# Patient Record
Sex: MALE | Race: OTHER | NOT HISPANIC OR LATINO
[De-identification: names, ages, dates, MRNs, and addresses within clinical notes are randomized per-mention and may not be internally consistent; named-entity substitution may affect disease eponyms.]

---

## 2020-08-07 ENCOUNTER — APPOINTMENT (OUTPATIENT)
Dept: DISASTER EMERGENCY | Facility: CLINIC | Age: 75
End: 2020-08-07

## 2020-08-07 VITALS
OXYGEN SATURATION: 97 % | TEMPERATURE: 98 F | HEART RATE: 64 BPM | WEIGHT: 244.93 LBS | DIASTOLIC BLOOD PRESSURE: 74 MMHG | SYSTOLIC BLOOD PRESSURE: 173 MMHG | HEIGHT: 68 IN | RESPIRATION RATE: 18 BRPM

## 2020-08-07 DIAGNOSIS — Z01.818 ENCOUNTER FOR OTHER PREPROCEDURAL EXAMINATION: ICD-10-CM

## 2020-08-07 RX ORDER — ENALAPRIL MALEATE AND HYDROCHLOROTHIAZIDE 10; 25 MG/1; MG/1
1 TABLET ORAL
Qty: 0 | Refills: 0 | DISCHARGE

## 2020-08-07 RX ORDER — ISOSORBIDE MONONITRATE 60 MG/1
1 TABLET, EXTENDED RELEASE ORAL
Qty: 0 | Refills: 0 | DISCHARGE

## 2020-08-07 RX ORDER — METOPROLOL TARTRATE 50 MG
1 TABLET ORAL
Qty: 0 | Refills: 0 | DISCHARGE

## 2020-08-07 RX ORDER — SIMVASTATIN 20 MG/1
1 TABLET, FILM COATED ORAL
Qty: 0 | Refills: 0 | DISCHARGE

## 2020-08-07 RX ORDER — TAMSULOSIN HYDROCHLORIDE 0.4 MG/1
1 CAPSULE ORAL
Qty: 0 | Refills: 0 | DISCHARGE

## 2020-08-07 NOTE — H&P ADULT - HISTORY OF PRESENT ILLNESS
Skeleton  Verify meds    Cardiologist: Dr. Shaik Castro  Pharmacy:  Covid:  Escort:    75 yo M with PMHx of HTN, HLD, heart murmur, CAD s/p ?PCI (where, when?), PVD, ESTEPHANIE (on CPAP)    NST per MD note: anteroapical and inferior moderate to severe ischemia.     In light of pt's risk factors, CCS Anginal Class ___ Sx, abnormal NST per MD note, pt referred for cardiac cath with possible intervention to r/o progressive CAD. Verify meds    Cardiologist: Dr. Shaik Castro  Pharmacy: [facility] pharmacy [phone] [address]  Covid: Covid test completed 8/7 in [facility] ([facility]  Escort: Angel Stanford    75 yo M with PMHx of HTN, HLD, CAD s/p PCI x1 ([facility], 9-10 years ago), PVD s/p B/L ablation, ESTEPHANIE (on CPAP), BPH s/p TURP in 2012, who presented to Cardiologist Dr. Shaik Castro with c/o non-radiating SSCP described as tightness of mild intensity with associated fatigue upon ambulation of 2-3 city blocks over past 2-3 months. Denies SOB, dizziness, diaphoresis, palpitations, LE edema, orthopnea, PND, syncope, N/V, abdominal pain. NST per MD note: anteroapical and inferior moderate to severe ischemia. In light of pt's risk factors, CCS Anginal Class III Sx, abnormal NST per MD note, pt referred for cardiac cath with possible intervention to r/o progressive CAD. Verify meds    Cardiologist: Dr. Shaik Castro  Pharmacy: [facility] pharmacy [phone] [address]  Covid: Covid test completed 8/7 in [facility])  Escort: Angel Stanford    73 yo M with PMHx of HTN, HLD, CAD s/p PCI x1 ([facility], 9-10 years ago), PVD s/p B/L venous ablation, ESTEPHANIE (on CPAP), BPH s/p TURP in 2012, who presented to Cardiologist Dr. Shaik Castro with c/o non-radiating SSCP described as tightness of mild intensity with associated fatigue upon ambulation of 2-3 city blocks over past 2-3 months. Denies SOB, dizziness, diaphoresis, palpitations, LE edema, orthopnea, PND, syncope, N/V, abdominal pain. NST per MD note: anteroapical and inferior moderate to severe ischemia. In light of pt's risk factors, CCS Anginal Class III Sx, abnormal NST per MD note, pt referred for cardiac cath with possible intervention to r/o progressive CAD. Verify meds    Cardiologist: Dr. Shaik Castro  Pharmacy: [facility] pharmacy [phone] [address]  Covid: Covid test completed 8/7 in [facility]  Escort: Angel Stanford    75 yo M with PMHx of HTN, HLD, CAD s/p PCI x1 ([facility] on 7/29/09 revealing mRCA 60-70% CSA 2.9 mm2 s/p PABLO), PVD s/p B/L venous ablation, ESTEPHANIE (on CPAP), BPH s/p TURP in 2012, who presented to Cardiologist Dr. Shaik Castro with c/o non-radiating SSCP described as tightness of mild intensity with associated fatigue upon ambulation of 2-3 city blocks over past 2-3 months. Denies SOB, dizziness, diaphoresis, palpitations, LE edema, orthopnea, PND, syncope, N/V, abdominal pain. NST per MD note: anteroapical and inferior moderate to severe ischemia. In light of pt's risk factors, CCS Anginal Class III Sx, abnormal NST per MD note, pt referred for cardiac cath with possible intervention to r/o progressive CAD.     Cath hx:  Cardiac cath at [facility] 7/29/09: mRCA 60-70% CSA 2.9 mm2 s/p PABLO Cardiologist: Dr. Shaik Castro  Pharmacy: [facility] pharmacy [phone] [phone]  Covid: Covid negative 08/07 in [facility]  Escort: Son Hien    75 yo M with PMHx of HTN, HLD, CAD s/p PCI x1 ([facility] on 7/29/09 revealing mRCA 60-70% CSA 2.9 mm2 s/p PABLO), PVD s/p B/L venous ablation, ESTEPHANIE (on CPAP), BPH s/p TURP in 2012, who presented to Cardiologist Dr. Shaik Castro with c/o non-radiating SSCP described as tightness of mild intensity with associated fatigue upon ambulation of 2-3 city blocks over past 2-3 months. Denies SOB, dizziness, diaphoresis, palpitations, LE edema, orthopnea, PND, syncope, N/V, abdominal pain. NST per MD note: anteroapical and inferior moderate to severe ischemia. In light of pt's risk factors, CCS Anginal Class III Sx, abnormal NST per MD note, pt referred for cardiac cath with possible intervention to r/o progressive CAD.     Cath hx:  Cardiac cath at [facility] 7/29/09: mRCA 60-70% CSA 2.9 mm2 s/p PABLO

## 2020-08-07 NOTE — H&P ADULT - GASTROINTESTINAL DETAILS
soft/nontender/no distention/normal soft/no rebound tenderness/nontender/bowel sounds normal/no distention/normal

## 2020-08-07 NOTE — H&P ADULT - ASSESSMENT
75 yo M with PMHx of HTN, HLD, CAD s/p PCI x1 ([facility] on 7/29/09 revealing mRCA 60-70% CSA 2.9 mm2 s/p PABLO), PVD s/p B/L venous ablation, ESTEPHANIE (on CPAP), BPH s/p TURP in 2012, who presents for cardiac catheterization secondary to CCS III anginal symptoms in the setting of an abnormal stress test    ASA III Mallampati III  Precath consented   Started IVF NS @ 75cc/h  Loaded with ???    Risks & benefits of procedure and alternative therapy have been explained to the patient including but not limited to: allergic reaction, bleeding w/possible need for blood transfusion, infection, renal and vascular compromise, limb damage, arrhythmia, stroke, vessel dissection/perforation, Myocardial infarction, emergent CABG. Informed consent obtained and in chart. 73 yo M with PMHx of HTN, HLD, CAD s/p PCI x1 ([facility] on 7/29/09 revealing mRCA 60-70% CSA 2.9 mm2 s/p PABLO), PVD s/p B/L venous ablation, ESTEPHANIE (on CPAP), BPH s/p TURP in 2012, who presents for cardiac catheterization secondary to CCS III anginal symptoms in the setting of an abnormal stress test.     ASA III Mallampati III  Precath consented   Started IVF NS @ 75cc/h x 4 hours.    Loaded with ASA 325mg and plavix 600mg.     Risks & benefits of procedure and alternative therapy have been explained to the patient including but not limited to: allergic reaction, bleeding w/possible need for blood transfusion, infection, renal and vascular compromise, limb damage, arrhythmia, stroke, vessel dissection/perforation, Myocardial infarction, emergent CABG. Informed consent obtained and in chart.

## 2020-08-07 NOTE — H&P ADULT - RS GEN PE MLT RESP DETAILS PC
normal/clear to auscultation bilaterally normal/no rales/clear to auscultation bilaterally/no rhonchi/no wheezes

## 2020-08-07 NOTE — H&P ADULT - NSICDXPASTMEDICALHX_GEN_ALL_CORE_FT
PAST MEDICAL HISTORY:  Coronary artery disease     Hyperlipidemia     Hypertension     ESTEPHANIE on CPAP     PVD (peripheral vascular disease)

## 2020-08-07 NOTE — H&P ADULT - NSICDXPASTSURGICALHX_GEN_ALL_CORE_FT
PAST SURGICAL HISTORY:  H/O prior ablation treatment h/o B/L venous ablation    Stented coronary artery

## 2020-08-07 NOTE — H&P ADULT - NSHPLABSRESULTS_GEN_ALL_CORE
EKG: 13.0   7.94  )-----------( 169      ( 10 Aug 2020 09:57 )             42.0       08-10    141  |  103  |  19  ----------------------------<  147<H>  3.9   |  25  |  0.83    Ca    9.3      10 Aug 2020 09:57    TPro  7.3  /  Alb  4.4  /  TBili  0.3  /  DBili  x   /  AST  18  /  ALT  10  /  AlkPhos  70  08-10      PT/INR - ( 10 Aug 2020 09:57 )   PT: 12.7 sec;   INR: 1.06          PTT - ( 10 Aug 2020 09:57 )  PTT:29.4 sec    CARDIAC MARKERS ( 10 Aug 2020 09:57 )  x     / x     / 140 U/L / x     / 1.5 ng/mL      EKG: NSR @ 63 bpm, q waves in inferior leads, no acute ischemic changes

## 2020-08-08 LAB — SARS-COV-2 N GENE NPH QL NAA+PROBE: NOT DETECTED

## 2020-08-10 ENCOUNTER — TRANSCRIPTION ENCOUNTER (OUTPATIENT)
Age: 75
End: 2020-08-10

## 2020-08-10 ENCOUNTER — INPATIENT (INPATIENT)
Facility: HOSPITAL | Age: 75
LOS: 0 days | Discharge: HOME | DRG: 247 | End: 2020-08-11
Attending: INTERNAL MEDICINE | Admitting: INTERNAL MEDICINE
Payer: COMMERCIAL
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
## 2020-08-10 DIAGNOSIS — Z98.890 OTHER SPECIFIED POSTPROCEDURAL STATES: Chronic | ICD-10-CM

## 2020-08-10 DIAGNOSIS — T82.855A STENOSIS OF CORONARY ARTERY STENT, INITIAL ENCOUNTER: ICD-10-CM

## 2020-08-10 DIAGNOSIS — Z95.5 PRESENCE OF CORONARY ANGIOPLASTY IMPLANT AND GRAFT: Chronic | ICD-10-CM

## 2020-08-10 LAB
A1C WITH ESTIMATED AVERAGE GLUCOSE RESULT: 5.9 % — HIGH (ref 4–5.6)
ALBUMIN SERPL ELPH-MCNC: 4.4 G/DL — SIGNIFICANT CHANGE UP (ref 3.3–5)
ALP SERPL-CCNC: 70 U/L — SIGNIFICANT CHANGE UP (ref 40–120)
ALT FLD-CCNC: 10 U/L — SIGNIFICANT CHANGE UP (ref 10–45)
ANION GAP SERPL CALC-SCNC: 13 MMOL/L — SIGNIFICANT CHANGE UP (ref 5–17)
APTT BLD: 29.4 SEC — SIGNIFICANT CHANGE UP (ref 27.5–35.5)
AST SERPL-CCNC: 18 U/L — SIGNIFICANT CHANGE UP (ref 10–40)
BASOPHILS # BLD AUTO: 0.02 K/UL — SIGNIFICANT CHANGE UP (ref 0–0.2)
BASOPHILS NFR BLD AUTO: 0.3 % — SIGNIFICANT CHANGE UP (ref 0–2)
BILIRUB SERPL-MCNC: 0.3 MG/DL — SIGNIFICANT CHANGE UP (ref 0.2–1.2)
BUN SERPL-MCNC: 19 MG/DL — SIGNIFICANT CHANGE UP (ref 7–23)
CALCIUM SERPL-MCNC: 9.3 MG/DL — SIGNIFICANT CHANGE UP (ref 8.4–10.5)
CHLORIDE SERPL-SCNC: 103 MMOL/L — SIGNIFICANT CHANGE UP (ref 96–108)
CHOLEST SERPL-MCNC: 106 MG/DL — SIGNIFICANT CHANGE UP (ref 10–199)
CK MB CFR SERPL CALC: 1.5 NG/ML — SIGNIFICANT CHANGE UP (ref 0–6.7)
CK SERPL-CCNC: 140 U/L — SIGNIFICANT CHANGE UP (ref 30–200)
CO2 SERPL-SCNC: 25 MMOL/L — SIGNIFICANT CHANGE UP (ref 22–31)
CREAT SERPL-MCNC: 0.83 MG/DL — SIGNIFICANT CHANGE UP (ref 0.5–1.3)
CRP SERPL-MCNC: 0.47 MG/DL — HIGH (ref 0–0.4)
EOSINOPHIL # BLD AUTO: 0.33 K/UL — SIGNIFICANT CHANGE UP (ref 0–0.5)
EOSINOPHIL NFR BLD AUTO: 4.2 % — SIGNIFICANT CHANGE UP (ref 0–6)
ESTIMATED AVERAGE GLUCOSE: 123 MG/DL — HIGH (ref 68–114)
GLUCOSE SERPL-MCNC: 147 MG/DL — HIGH (ref 70–99)
HCT VFR BLD CALC: 42 % — SIGNIFICANT CHANGE UP (ref 39–50)
HDLC SERPL-MCNC: 32 MG/DL — LOW
HGB BLD-MCNC: 13 G/DL — SIGNIFICANT CHANGE UP (ref 13–17)
IMM GRANULOCYTES NFR BLD AUTO: 0.1 % — SIGNIFICANT CHANGE UP (ref 0–1.5)
INR BLD: 1.06 — SIGNIFICANT CHANGE UP (ref 0.88–1.16)
LIPID PNL WITH DIRECT LDL SERPL: 45 MG/DL — SIGNIFICANT CHANGE UP
LYMPHOCYTES # BLD AUTO: 1.81 K/UL — SIGNIFICANT CHANGE UP (ref 1–3.3)
LYMPHOCYTES # BLD AUTO: 22.8 % — SIGNIFICANT CHANGE UP (ref 13–44)
MCHC RBC-ENTMCNC: 26.5 PG — LOW (ref 27–34)
MCHC RBC-ENTMCNC: 31 GM/DL — LOW (ref 32–36)
MCV RBC AUTO: 85.7 FL — SIGNIFICANT CHANGE UP (ref 80–100)
MONOCYTES # BLD AUTO: 0.7 K/UL — SIGNIFICANT CHANGE UP (ref 0–0.9)
MONOCYTES NFR BLD AUTO: 8.8 % — SIGNIFICANT CHANGE UP (ref 2–14)
NEUTROPHILS # BLD AUTO: 5.07 K/UL — SIGNIFICANT CHANGE UP (ref 1.8–7.4)
NEUTROPHILS NFR BLD AUTO: 63.8 % — SIGNIFICANT CHANGE UP (ref 43–77)
NRBC # BLD: 0 /100 WBCS — SIGNIFICANT CHANGE UP (ref 0–0)
PLATELET # BLD AUTO: 169 K/UL — SIGNIFICANT CHANGE UP (ref 150–400)
POTASSIUM SERPL-MCNC: 3.9 MMOL/L — SIGNIFICANT CHANGE UP (ref 3.5–5.3)
POTASSIUM SERPL-SCNC: 3.9 MMOL/L — SIGNIFICANT CHANGE UP (ref 3.5–5.3)
PROT SERPL-MCNC: 7.3 G/DL — SIGNIFICANT CHANGE UP (ref 6–8.3)
PROTHROM AB SERPL-ACNC: 12.7 SEC — SIGNIFICANT CHANGE UP (ref 10.6–13.6)
RBC # BLD: 4.9 M/UL — SIGNIFICANT CHANGE UP (ref 4.2–5.8)
RBC # FLD: 14.4 % — SIGNIFICANT CHANGE UP (ref 10.3–14.5)
SODIUM SERPL-SCNC: 141 MMOL/L — SIGNIFICANT CHANGE UP (ref 135–145)
TOTAL CHOLESTEROL/HDL RATIO MEASUREMENT: 3.3 RATIO — LOW (ref 3.4–9.6)
TRIGL SERPL-MCNC: 146 MG/DL — SIGNIFICANT CHANGE UP (ref 10–149)
WBC # BLD: 7.94 K/UL — SIGNIFICANT CHANGE UP (ref 3.8–10.5)
WBC # FLD AUTO: 7.94 K/UL — SIGNIFICANT CHANGE UP (ref 3.8–10.5)

## 2020-08-10 PROCEDURE — 92928 PRQ TCAT PLMT NTRAC ST 1 LES: CPT | Mod: LC

## 2020-08-10 PROCEDURE — 92978 ENDOLUMINL IVUS OCT C 1ST: CPT | Mod: 26,LM

## 2020-08-10 PROCEDURE — 99222 1ST HOSP IP/OBS MODERATE 55: CPT

## 2020-08-10 PROCEDURE — 93454 CORONARY ARTERY ANGIO S&I: CPT | Mod: 26,59

## 2020-08-10 PROCEDURE — 93010 ELECTROCARDIOGRAM REPORT: CPT

## 2020-08-10 RX ORDER — CLOPIDOGREL BISULFATE 75 MG/1
75 TABLET, FILM COATED ORAL DAILY
Refills: 0 | Status: DISCONTINUED | OUTPATIENT
Start: 2020-08-11 | End: 2020-08-11

## 2020-08-10 RX ORDER — TAMSULOSIN HYDROCHLORIDE 0.4 MG/1
0.4 CAPSULE ORAL AT BEDTIME
Refills: 0 | Status: DISCONTINUED | OUTPATIENT
Start: 2020-08-10 | End: 2020-08-11

## 2020-08-10 RX ORDER — SODIUM CHLORIDE 9 MG/ML
500 INJECTION INTRAMUSCULAR; INTRAVENOUS; SUBCUTANEOUS
Refills: 0 | Status: DISCONTINUED | OUTPATIENT
Start: 2020-08-10 | End: 2020-08-11

## 2020-08-10 RX ORDER — CLOPIDOGREL BISULFATE 75 MG/1
600 TABLET, FILM COATED ORAL ONCE
Refills: 0 | Status: COMPLETED | OUTPATIENT
Start: 2020-08-10 | End: 2020-08-10

## 2020-08-10 RX ORDER — METOPROLOL TARTRATE 50 MG
25 TABLET ORAL
Refills: 0 | Status: DISCONTINUED | OUTPATIENT
Start: 2020-08-10 | End: 2020-08-11

## 2020-08-10 RX ORDER — ISOSORBIDE MONONITRATE 60 MG/1
30 TABLET, EXTENDED RELEASE ORAL DAILY
Refills: 0 | Status: DISCONTINUED | OUTPATIENT
Start: 2020-08-10 | End: 2020-08-11

## 2020-08-10 RX ORDER — SODIUM CHLORIDE 9 MG/ML
500 INJECTION INTRAMUSCULAR; INTRAVENOUS; SUBCUTANEOUS
Refills: 0 | Status: DISCONTINUED | OUTPATIENT
Start: 2020-08-10 | End: 2020-08-10

## 2020-08-10 RX ORDER — CHLORHEXIDINE GLUCONATE 213 G/1000ML
1 SOLUTION TOPICAL ONCE
Refills: 0 | Status: DISCONTINUED | OUTPATIENT
Start: 2020-08-10 | End: 2020-08-10

## 2020-08-10 RX ORDER — SIMVASTATIN 20 MG/1
20 TABLET, FILM COATED ORAL ONCE
Refills: 0 | Status: COMPLETED | OUTPATIENT
Start: 2020-08-10 | End: 2020-08-10

## 2020-08-10 RX ORDER — ASPIRIN/CALCIUM CARB/MAGNESIUM 324 MG
325 TABLET ORAL ONCE
Refills: 0 | Status: COMPLETED | OUTPATIENT
Start: 2020-08-10 | End: 2020-08-10

## 2020-08-10 RX ORDER — ASPIRIN/CALCIUM CARB/MAGNESIUM 324 MG
81 TABLET ORAL DAILY
Refills: 0 | Status: DISCONTINUED | OUTPATIENT
Start: 2020-08-11 | End: 2020-08-11

## 2020-08-10 RX ADMIN — SODIUM CHLORIDE 75 MILLILITER(S): 9 INJECTION INTRAMUSCULAR; INTRAVENOUS; SUBCUTANEOUS at 10:33

## 2020-08-10 RX ADMIN — Medication 325 MILLIGRAM(S): at 10:47

## 2020-08-10 RX ADMIN — ISOSORBIDE MONONITRATE 30 MILLIGRAM(S): 60 TABLET, EXTENDED RELEASE ORAL at 15:05

## 2020-08-10 RX ADMIN — SODIUM CHLORIDE 75 MILLILITER(S): 9 INJECTION INTRAMUSCULAR; INTRAVENOUS; SUBCUTANEOUS at 15:17

## 2020-08-10 RX ADMIN — CLOPIDOGREL BISULFATE 600 MILLIGRAM(S): 75 TABLET, FILM COATED ORAL at 10:48

## 2020-08-10 RX ADMIN — Medication 25 MILLIGRAM(S): at 17:02

## 2020-08-10 RX ADMIN — TAMSULOSIN HYDROCHLORIDE 0.4 MILLIGRAM(S): 0.4 CAPSULE ORAL at 21:05

## 2020-08-10 NOTE — DISCHARGE NOTE PROVIDER - CARE PROVIDERS DIRECT ADDRESSES
[email or web address] ,DirectAddress_Unknown

## 2020-08-10 NOTE — DISCHARGE NOTE PROVIDER - HOSPITAL COURSE
75 yo M with PMHx of HTN, HLD, CAD s/p PCI x1 ([facility] on 7/29/09 revealing mRCA 60-70% CSA 2.9 mm2 s/p PABLO), PVD s/p B/L venous ablation, ESTEPHANIE (on CPAP), BPH s/p TURP in 2012, who presented to Cardiologist Dr. Shaik Castro with c/o non-radiating SSCP described as tightness of mild intensity with associated fatigue upon ambulation of 2-3 city blocks over past 2-3 months. Denies SOB, dizziness, diaphoresis, palpitations, LE edema, orthopnea, PND, syncope, N/V, abdominal pain. NST per MD note: anteroapical and inferior moderate to severe ischemia. In light of pt's risk factors, CCS Anginal Class III Sx, abnormal NST per MD note, pt referred for cardiac cath with possible intervention to r/o progressive CAD. Pt now s/p cardiac cath on 8/10/2020: PABLO mLCX (80%). LM 30% (IVUS wihtout dissection), p/m LAD 30%. Patent prox RCA stent. EF unknown. Right radial access. 75 yo M with PMHx of HTN, HLD, CAD s/p PCI x1 ([facility] on 7/29/09 revealing mRCA 60-70% CSA 2.9 mm2 s/p PABLO), PVD s/p B/L venous ablation, ESTEPHANIE (on CPAP), BPH s/p TURP in 2012, who presented to Cardiologist Dr. Shaik Castro with c/o non-radiating SSCP described as tightness of mild intensity with associated fatigue upon ambulation of 2-3 city blocks over past 2-3 months. Denies SOB, dizziness, diaphoresis, palpitations, LE edema, orthopnea, PND, syncope, N/V, abdominal pain. NST per MD note: anteroapical and inferior moderate to severe ischemia. In light of pt's risk factors, CCS Anginal Class III Sx, abnormal NST per MD note, pt referred for cardiac cath with possible intervention to r/o progressive CAD. Pt now s/p cardiac cath on 8/10/2020: PABLO mLCX (80%). LM 30% (IVUS wihtout dissection), p/m LAD 30%. Patent prox RCA stent. EF unknown. Right radial access.         No significant events on telemetry overnight. Repeat EKG without ischemic changes. Patient has been medically cleared for discharge as per Dr. Little. Patient has been given appropriate discharge instructions including medication regimen, access site management and follow up. Medications that patient needs refills on have been e-prescribed to preferred pharmacy.         VS Stable     Gen: NAD, A&O x3    Cards: RRR, clear S1 and S2 without murmur    Pulm: CTA B/L without w/r/r    Right  Radial: No hematoma or ooze, peripheral pulses 2+ B/L    Abd: soft, NT    Ext: no LE edema or ulcerations B/L

## 2020-08-10 NOTE — DISCHARGE NOTE PROVIDER - NSDCMRMEDTOKEN_GEN_ALL_CORE_FT
Ecotrin Adult Low Strength 81 mg oral delayed release tablet: 1 tab(s) orally once a day  enalapril-hydrochlorothiazide 5 mg-12.5 mg oral tablet: 1 tab(s) orally once a day  Flomax 0.4 mg oral capsule: 1 cap(s) orally once a day  isosorbide mononitrate 30 mg oral tablet, extended release: 1 tab(s) orally once a day (in the morning)  metoprolol tartrate 25 mg oral tablet: 1 tab(s) orally 2 times a day  simvastatin 20 mg oral tablet: 1 tab(s) orally once a day (at bedtime) clopidogrel 75 mg oral tablet: 1 tab(s) orally once a day  Ecotrin Adult Low Strength 81 mg oral delayed release tablet: 1 tab(s) orally once a day  enalapril-hydrochlorothiazide 5 mg-12.5 mg oral tablet: 1 tab(s) orally once a day  Flomax 0.4 mg oral capsule: 1 cap(s) orally once a day  isosorbide mononitrate 30 mg oral tablet, extended release: 1 tab(s) orally once a day (in the morning)  metoprolol tartrate 25 mg oral tablet: 1 tab(s) orally 2 times a day  simvastatin 20 mg oral tablet: 1 tab(s) orally once a day (at bedtime)

## 2020-08-10 NOTE — DISCHARGE NOTE PROVIDER - CARE PROVIDER_API CALL
Jed Dia (MD)  Cardiovascular Disease; Interventional Cardiology  [address] [place] [address]  Phone: [phone]  Fax: [phone]  Follow Up Time: Shaik SANDIP Castro  CARDIOLOGY  [address] [place] [address]  Phone: [phone]  Fax: [phone]  Follow Up Time: 2 weeks

## 2020-08-10 NOTE — DISCHARGE NOTE PROVIDER - NSDCCPCAREPLAN_GEN_ALL_CORE_FT
PRINCIPAL DISCHARGE DIAGNOSIS  Diagnosis: CAD (coronary artery disease)  Assessment and Plan of Treatment: You have blockages in the arteries that give blood and oxygen to your heart. This is called CORONARY ARTERY DISEASE. You had a CARDIAC CATHETERIZATION procedure and recieved a drug eluting STENT to your mid LEFT CIRCUMFLEX coronary artery. It is VERY IMPORTANT that you take ASPIRIN 81mg daily and PLAVIX (CLOPIDOGREL) 75mg daily  to avoid blood clots forming in your stents that could give you a heart attack. Right wrist wound care: Do not lift anything heavier than 5 pounds for 5 days. Observe for signs of bleeding including bleeding/ sudden swelling to your right wrist site or numbness/tingling/pain/coolness to your right wrist/hand/fingers/forearm. If you experience these symptoms call your cardiologist and go to the nearest ER immediately. FOLLOW UP WITH DR DELMAR HUFFMNA IN 1-2 WEEKS.      SECONDARY DISCHARGE DIAGNOSES  Diagnosis: Hyperlipidemia  Assessment and Plan of Treatment: Continu taking SIMVASTATIN 20mg daily at bedtime for cholesterol control    Diagnosis: Hypertension  Assessment and Plan of Treatment: Continue taking ENALAPRIL-HYDROCHLOROTHIAZIDE 5mg-12.5mg daily for blood pressure control

## 2020-08-10 NOTE — CONSULT NOTE ADULT - SUBJECTIVE AND OBJECTIVE BOX
Preventive Cardiology Consultation Note    CHIEF COMPLAINT: s/p cardiac catheterization requiring cardiovascular prevention optimization and education    HISTORY OF PRESENT ILLNESS: 73 yo M with PMHx of HTN, HLD, CAD s/p PCI x1 ([facility] on 7/29/09 revealing mRCA 60-70% CSA 2.9 mm2 s/p PABLO), PVD s/p B/L venous ablation, ESTEPHANIE (on CPAP), BPH s/p TURP in 2012, who presented to Cardiologist Dr. Shaik Castro with c/o non-radiating SSCP with minimal exertion of the past 2-3 months. Patient is now s/p cardiac cath on 8/10/2020: PABLO mLCX (80%). LM 30% (IVUS wihtout dissection), p/m LAD 30%. Patent prox RCA stent.    Review of systems otherwise negative.     Lifestyle History:  Mediterranean Diet Score (9 question survey) was 4.   (8-9: optimal, 6-7: near-optimal, 4-5: suboptimal, 0-3: markedly suboptimal)  Exercise: Patient reports exercising at a moderate level for <30 minutes per week.    Smoking: Patient denies any history of smoking.   Stress: Patient denies any stress.     PAST MEDICAL & SURGICAL HISTORY:  Coronary artery disease  PVD (peripheral vascular disease)  Hyperlipidemia  Hypertension  ESTEPHANIE on CPAP  Stented coronary artery  H/O prior ablation treatment: h/o B/L venous ablation    FAMILY HISTORY:   Patient denies any first degree family history of premature CAD or CVA.     Allergies:   No Known Allergies      HOME MEDICATIONS:   Ecotrin Adult Low Strength 81 mg oral delayed release tablet: 1 tab(s) orally once a day (07 Aug 2020 12:29)  enalapril-hydrochlorothiazide 5 mg-12.5 mg oral tablet: 1 tab(s) orally once a day (07 Aug 2020 12:29)  Flomax 0.4 mg oral capsule: 1 cap(s) orally once a day (07 Aug 2020 12:29)  isosorbide mononitrate 30 mg oral tablet, extended release: 1 tab(s) orally once a day (in the morning) (07 Aug 2020 12:29)  metoprolol tartrate 25 mg oral tablet: 1 tab(s) orally 2 times a day (07 Aug 2020 12:29)  simvastatin 20 mg oral tablet: 1 tab(s) orally once a day (at bedtime) (07 Aug 2020 12:29)      PHYSICAL EXAM:  T(C): 36.6 (08-10-20 @ 17:02), Max: 36.6 (08-10-20 @ 17:02)  T(F): 97.8 (08-10-20 @ 17:02), Max: 97.8 (08-10-20 @ 17:02)  HR: 60 (08-10-20 @ 17:01) (60 - 82)  BP: 126/60 (08-10-20 @ 17:01) (121/56 - 177/86)  RR: 16 (08-10-20 @ 17:01) (16 - 16)  SpO2: 97% (08-10-20 @ 17:01) (97% - 97%)  Height (cm): 172.72 (08-10 @ 10:42)  Weight (kg): 111.1 (08-10 @ 10:42)  BMI (kg/m2): 37.2 (08-10 @ 10:42)  	  Gen- awake, conversive  Head-NCAT; eyes: no corneal arcus noted b/l; no xanthelasmas   Neck- no thyromegaly, no cervical LAD, no JVD, no carotid bruit b/l  Respiratory- good air entry b/l, no WRR  Cardiovascular- S1S2, RRR, no MRG appr, no LE edema b/l, distal pulses 2+ b/l  Gastrointestinal- no HSM, no masses  Neurology- moves all extremities, no focal deficits  Psych- normal affect, non-depressed mood  Skin- no lesions, no rashes, no xanthomas     LABS:	                        13.0   7.94  )-----------( 169      ( 10 Aug 2020 09:57 )             42.0     08-10    141  |  103  |  19  ----------------------------<  147<H>  3.9   |  25  |  0.83    Ca    9.3      10 Aug 2020 09:57    TPro  7.3  /  Alb  4.4  /  TBili  0.3  /  DBili  x   /  AST  18  /  ALT  10  /  AlkPhos  70  08-10      Cholesterol, Serum: 106 mg/dL (08-10 @ 09:57)  HDL Cholesterol, Serum: 32 mg/dL (08-10 @ 09:57)  Triglycerides, Serum: 146 mg/dL (08-10 @ 09:57)  Direct LDL: 45 mg/dL (08-10 @ 09:57)    C-Reactive Protein, Serum: 0.47 mg/dL (08-10 @ 09:57)      ASSESSMENT/RECOMMENDATIONS: 	  Patient's dietary, exercise and overall lifestyle habits were reviewed. The concept of atherosclerosis and its systemic nature was discussed with a focus on the need to get all cardiovascular risk factors to goal. At this time, I would like to make the following recommendations to optimize atherosclerotic risk factors.     RECOMMENDATIONS:   Anti-platelet Therapy: DAPT per interventionalist recommendation.   Lipid Therapy: Patient is currently taking simvastatin 20mg daily and is compliant and tolerating it well.  In general, we recommend the use of atorvastatin or rosuvastatin, if tolerated. We would recommend switching the patient to either of those agents if possible for better lipid control. If that is not feasible, it would be reasonable to increase the current statin dosage, as his LDL- C is at goal, but the strength of the statin is not ideal.   Hypertension: Blood pressures during this stay were well-controlled.   Mediterranean Diet Score is 4. Some suggestions include continue incorporating 2 or more servings per day of vegetables, fruits, and whole grains. Increase intake of fish and legumes/beans to 2 or more servings per week. Aim to increase intake of healthy fats, such as olive oil and avocados, and have a handful of nuts/seeds most days. Reduce red/processed meat consumption to 2 or fewer times per week.   Exercise: Recommended gradually increasing activity to 30-45 minutes most days of the week once cleared by referring cardiologist. Cardiac rehab might benefit this patient and is covered by major insurance plans (other than co-pays), please refer.   Medication Adherence: Patient has no issues with adherence at this time.   Smoking: This patient is a non-smoker.   Obesity/Overweight: The patient was advised about specific mechanisms such as reduced portions and increased activity for efforts toward weight loss.   Glucometabolic State: Based on HbA1c 5.9% today, patient is in the prediabetic range. We would recommend following up with his PCP for continued monitoring.   Sleep Apnea: This patient has known ESTEPHANIE and reports compliance with CPAP nightly.   Psychological Stress: The patient appears to be coping with stressors well at this time.     Thank you for the opportunity to see this patient. Please feel free to contact Prevention if there are any questions, or if you feel that your patient would benefit from continued follow-up visits with the Program.    Katelyn Salvador, [facility]  Cardiovascular Prevention     Luciana Bhatt MD  System Director, Cardiovascular Prevention

## 2020-08-11 ENCOUNTER — TRANSCRIPTION ENCOUNTER (OUTPATIENT)
Age: 75
End: 2020-08-11

## 2020-08-11 VITALS — TEMPERATURE: 98 F

## 2020-08-11 LAB
ALBUMIN SERPL ELPH-MCNC: 3.7 G/DL — SIGNIFICANT CHANGE UP (ref 3.3–5)
ALP SERPL-CCNC: 65 U/L — SIGNIFICANT CHANGE UP (ref 40–120)
ALT FLD-CCNC: 10 U/L — SIGNIFICANT CHANGE UP (ref 10–45)
ANION GAP SERPL CALC-SCNC: 10 MMOL/L — SIGNIFICANT CHANGE UP (ref 5–17)
AST SERPL-CCNC: 25 U/L — SIGNIFICANT CHANGE UP (ref 10–40)
BILIRUB SERPL-MCNC: 0.4 MG/DL — SIGNIFICANT CHANGE UP (ref 0.2–1.2)
BUN SERPL-MCNC: 20 MG/DL — SIGNIFICANT CHANGE UP (ref 7–23)
CALCIUM SERPL-MCNC: 8.8 MG/DL — SIGNIFICANT CHANGE UP (ref 8.4–10.5)
CHLORIDE SERPL-SCNC: 104 MMOL/L — SIGNIFICANT CHANGE UP (ref 96–108)
CO2 SERPL-SCNC: 26 MMOL/L — SIGNIFICANT CHANGE UP (ref 22–31)
CREAT SERPL-MCNC: 0.87 MG/DL — SIGNIFICANT CHANGE UP (ref 0.5–1.3)
GLUCOSE SERPL-MCNC: 109 MG/DL — HIGH (ref 70–99)
HCT VFR BLD CALC: 39.7 % — SIGNIFICANT CHANGE UP (ref 39–50)
HCV AB S/CO SERPL IA: 0.07 S/CO — SIGNIFICANT CHANGE UP
HCV AB SERPL-IMP: SIGNIFICANT CHANGE UP
HGB BLD-MCNC: 12.1 G/DL — LOW (ref 13–17)
MAGNESIUM SERPL-MCNC: 2.2 MG/DL — SIGNIFICANT CHANGE UP (ref 1.6–2.6)
MCHC RBC-ENTMCNC: 26.1 PG — LOW (ref 27–34)
MCHC RBC-ENTMCNC: 30.5 GM/DL — LOW (ref 32–36)
MCV RBC AUTO: 85.6 FL — SIGNIFICANT CHANGE UP (ref 80–100)
NRBC # BLD: 0 /100 WBCS — SIGNIFICANT CHANGE UP (ref 0–0)
PLATELET # BLD AUTO: 164 K/UL — SIGNIFICANT CHANGE UP (ref 150–400)
POTASSIUM SERPL-MCNC: 4 MMOL/L — SIGNIFICANT CHANGE UP (ref 3.5–5.3)
POTASSIUM SERPL-SCNC: 4 MMOL/L — SIGNIFICANT CHANGE UP (ref 3.5–5.3)
PROT SERPL-MCNC: 6.7 G/DL — SIGNIFICANT CHANGE UP (ref 6–8.3)
RBC # BLD: 4.64 M/UL — SIGNIFICANT CHANGE UP (ref 4.2–5.8)
RBC # FLD: 14.6 % — HIGH (ref 10.3–14.5)
SODIUM SERPL-SCNC: 140 MMOL/L — SIGNIFICANT CHANGE UP (ref 135–145)
WBC # BLD: 8.16 K/UL — SIGNIFICANT CHANGE UP (ref 3.8–10.5)
WBC # FLD AUTO: 8.16 K/UL — SIGNIFICANT CHANGE UP (ref 3.8–10.5)

## 2020-08-11 PROCEDURE — 99232 SBSQ HOSP IP/OBS MODERATE 35: CPT

## 2020-08-11 PROCEDURE — 93010 ELECTROCARDIOGRAM REPORT: CPT

## 2020-08-11 RX ORDER — ASPIRIN/CALCIUM CARB/MAGNESIUM 324 MG
1 TABLET ORAL
Qty: 30 | Refills: 11
Start: 2020-08-11 | End: 2021-08-05

## 2020-08-11 RX ORDER — CLOPIDOGREL BISULFATE 75 MG/1
1 TABLET, FILM COATED ORAL
Qty: 30 | Refills: 11
Start: 2020-08-11 | End: 2021-08-05

## 2020-08-11 RX ORDER — ASPIRIN/CALCIUM CARB/MAGNESIUM 324 MG
1 TABLET ORAL
Qty: 0 | Refills: 0 | DISCHARGE

## 2020-08-11 RX ADMIN — Medication 25 MILLIGRAM(S): at 05:01

## 2020-08-11 NOTE — PROGRESS NOTE ADULT - SUBJECTIVE AND OBJECTIVE BOX
Pt seen and examined at bedside. No acute events overnight.    Vital Signs Last 24 Hrs  T(C): 36.7 (11 Aug 2020 06:36), Max: 36.9 (10 Aug 2020 22:08)  T(F): 98 (11 Aug 2020 06:36), Max: 98.4 (10 Aug 2020 22:08)  HR: 57 (11 Aug 2020 06:06) (57 - 82)  BP: 142/80 (11 Aug 2020 04:55) (121/56 - 177/86)  RR: 16 (11 Aug 2020 06:06) (15 - 19)  SpO2: 99% (11 Aug 2020 04:55) (96% - 99%)    Gen: Awake, alert cooperative and in nad  HEENT: DAR  Chest: CTA  CVS: S1 S2 of normal intensity; RRR  Abd: Soft, NT, ND BS normoactive  Ext: No edema appreciated                          12.1   8.16  )-----------( 164      ( 11 Aug 2020 05:06 )             39.7   08-11    140  |  104  |  20  ----------------------------<  109<H>  4.0   |  26  |  0.87    Ca    8.8      11 Aug 2020 05:06  Mg     2.2     08-11    TPro  6.7  /  Alb  3.7  /  TBili  0.4  /  DBili  x   /  AST  25  /  ALT  10  /  AlkPhos  65  08-11    Tele: no acute events    A/P: 74M, s/p PCI ready for DC    --ASA, Plavix   --GDMT for CAD  --F/U with Dr Castro

## 2020-08-11 NOTE — DISCHARGE NOTE NURSING/CASE MANAGEMENT/SOCIAL WORK - PATIENT PORTAL LINK FT
You can access the FollowMyHealth Patient Portal offered by [facility] by registering at the following website: [email or web address]. By joining [facility]’s FollowMyHealth portal, you will also be able to view your health information using other applications (apps) compatible with our system.

## 2020-08-12 PROCEDURE — 85730 THROMBOPLASTIN TIME PARTIAL: CPT

## 2020-08-12 PROCEDURE — 86803 HEPATITIS C AB TEST: CPT

## 2020-08-12 PROCEDURE — C1894: CPT

## 2020-08-12 PROCEDURE — 83735 ASSAY OF MAGNESIUM: CPT

## 2020-08-12 PROCEDURE — C1769: CPT

## 2020-08-12 PROCEDURE — 85610 PROTHROMBIN TIME: CPT

## 2020-08-12 PROCEDURE — 82553 CREATINE MB FRACTION: CPT

## 2020-08-12 PROCEDURE — 93005 ELECTROCARDIOGRAM TRACING: CPT

## 2020-08-12 PROCEDURE — 36415 COLL VENOUS BLD VENIPUNCTURE: CPT

## 2020-08-12 PROCEDURE — C1725: CPT

## 2020-08-12 PROCEDURE — C1874: CPT

## 2020-08-12 PROCEDURE — C1753: CPT

## 2020-08-12 PROCEDURE — 80061 LIPID PANEL: CPT

## 2020-08-12 PROCEDURE — 85027 COMPLETE CBC AUTOMATED: CPT

## 2020-08-12 PROCEDURE — 80053 COMPREHEN METABOLIC PANEL: CPT

## 2020-08-12 PROCEDURE — 82550 ASSAY OF CK (CPK): CPT

## 2020-08-12 PROCEDURE — C1887: CPT

## 2020-08-12 PROCEDURE — 94660 CPAP INITIATION&MGMT: CPT

## 2020-08-12 PROCEDURE — 86140 C-REACTIVE PROTEIN: CPT

## 2020-08-12 PROCEDURE — 85025 COMPLETE CBC W/AUTO DIFF WBC: CPT

## 2020-08-12 PROCEDURE — 83036 HEMOGLOBIN GLYCOSYLATED A1C: CPT

## 2020-08-17 DIAGNOSIS — G47.33 OBSTRUCTIVE SLEEP APNEA (ADULT) (PEDIATRIC): ICD-10-CM

## 2020-08-17 DIAGNOSIS — Z79.82 LONG TERM (CURRENT) USE OF ASPIRIN: ICD-10-CM

## 2020-08-17 DIAGNOSIS — I73.9 PERIPHERAL VASCULAR DISEASE, UNSPECIFIED: ICD-10-CM

## 2020-08-17 DIAGNOSIS — N40.0 BENIGN PROSTATIC HYPERPLASIA WITHOUT LOWER URINARY TRACT SYMPTOMS: ICD-10-CM

## 2020-08-17 DIAGNOSIS — I25.10 ATHEROSCLEROTIC HEART DISEASE OF NATIVE CORONARY ARTERY WITHOUT ANGINA PECTORIS: ICD-10-CM

## 2020-08-17 DIAGNOSIS — I10 ESSENTIAL (PRIMARY) HYPERTENSION: ICD-10-CM

## 2020-08-17 DIAGNOSIS — Z95.5 PRESENCE OF CORONARY ANGIOPLASTY IMPLANT AND GRAFT: ICD-10-CM

## 2020-08-17 DIAGNOSIS — E66.9 OBESITY, UNSPECIFIED: ICD-10-CM

## 2020-08-17 DIAGNOSIS — Z99.89 DEPENDENCE ON OTHER ENABLING MACHINES AND DEVICES: ICD-10-CM

## 2020-08-17 DIAGNOSIS — E78.5 HYPERLIPIDEMIA, UNSPECIFIED: ICD-10-CM

## 2020-08-17 DIAGNOSIS — R73.03 PREDIABETES: ICD-10-CM

## 2020-08-17 DIAGNOSIS — I25.118 ATHEROSCLEROTIC HEART DISEASE OF NATIVE CORONARY ARTERY WITH OTHER FORMS OF ANGINA PECTORIS: ICD-10-CM

## 2022-08-13 NOTE — PATIENT PROFILE ADULT - NSPRESCRALCFREQ_GEN_A_NUR
Patient c/o pain to left shoulder that woke him from his sleep. Patient woke up confused as he has a history of seizures and is compliant with meds. Pt believes he may have had a seizure in the middle of he night and hurt his shoulder. Patient denies numbness and tingling, pulses and sensation are intact on both sides. Patient is unable to move or raise his arm due to shoulder pain. No obvious deformities seen.    Never

## 2025-06-11 ENCOUNTER — NON-APPOINTMENT (OUTPATIENT)
Age: 80
End: 2025-06-11

## 2025-06-12 ENCOUNTER — NON-APPOINTMENT (OUTPATIENT)
Age: 80
End: 2025-06-12

## 2025-06-13 ENCOUNTER — APPOINTMENT (OUTPATIENT)
Dept: ORTHOPEDIC SURGERY | Facility: CLINIC | Age: 80
End: 2025-06-13
Payer: MEDICARE

## 2025-06-13 PROCEDURE — 99204 OFFICE O/P NEW MOD 45 MIN: CPT

## 2025-06-13 PROCEDURE — 73030 X-RAY EXAM OF SHOULDER: CPT | Mod: LT

## 2025-06-13 PROCEDURE — 73010 X-RAY EXAM OF SHOULDER BLADE: CPT | Mod: LT

## 2025-06-13 RX ORDER — MELOXICAM 7.5 MG/1
7.5 TABLET ORAL
Qty: 30 | Refills: 2 | Status: ACTIVE | COMMUNITY
Start: 2025-06-13 | End: 1900-01-01

## 2025-06-20 ENCOUNTER — APPOINTMENT (OUTPATIENT)
Dept: MRI IMAGING | Facility: CLINIC | Age: 80
End: 2025-06-20
Payer: MEDICARE

## 2025-06-20 PROCEDURE — 73221 MRI JOINT UPR EXTREM W/O DYE: CPT | Mod: LT

## 2025-06-27 ENCOUNTER — APPOINTMENT (OUTPATIENT)
Dept: ORTHOPEDIC SURGERY | Facility: CLINIC | Age: 80
End: 2025-06-27
Payer: MEDICARE

## 2025-06-27 PROCEDURE — 99214 OFFICE O/P EST MOD 30 MIN: CPT
